# Patient Record
Sex: MALE | Race: WHITE | URBAN - METROPOLITAN AREA
[De-identification: names, ages, dates, MRNs, and addresses within clinical notes are randomized per-mention and may not be internally consistent; named-entity substitution may affect disease eponyms.]

---

## 2017-01-09 RX ORDER — CARVEDILOL 6.25 MG/1
TABLET ORAL
Qty: 60 TABLET | Refills: 0 | Status: SHIPPED | OUTPATIENT
Start: 2017-01-09 | End: 2017-01-19 | Stop reason: SDUPTHER

## 2017-01-17 RX ORDER — SIMVASTATIN 40 MG
TABLET ORAL
Qty: 30 TABLET | Refills: 0 | OUTPATIENT
Start: 2017-01-17

## 2017-01-19 RX ORDER — LISINOPRIL 2.5 MG/1
2.5 TABLET ORAL DAILY
Qty: 30 TABLET | Refills: 0 | Status: SHIPPED | OUTPATIENT
Start: 2017-01-19 | End: 2017-02-01 | Stop reason: SDUPTHER

## 2017-01-19 RX ORDER — SIMVASTATIN 40 MG
TABLET ORAL
Qty: 30 TABLET | Refills: 0 | Status: SHIPPED | OUTPATIENT
Start: 2017-01-19 | End: 2017-02-01 | Stop reason: SDUPTHER

## 2017-01-19 RX ORDER — CARVEDILOL 6.25 MG/1
TABLET ORAL
Qty: 60 TABLET | Refills: 0 | Status: SHIPPED | OUTPATIENT
Start: 2017-01-19 | End: 2017-02-01 | Stop reason: SDUPTHER

## 2017-02-01 ENCOUNTER — OFFICE VISIT (OUTPATIENT)
Dept: CARDIOLOGY CLINIC | Age: 82
End: 2017-02-01

## 2017-02-01 VITALS
OXYGEN SATURATION: 97 % | HEIGHT: 69 IN | WEIGHT: 175 LBS | BODY MASS INDEX: 25.92 KG/M2 | SYSTOLIC BLOOD PRESSURE: 120 MMHG | DIASTOLIC BLOOD PRESSURE: 78 MMHG | HEART RATE: 72 BPM

## 2017-02-01 DIAGNOSIS — I10 ESSENTIAL HYPERTENSION: Chronic | ICD-10-CM

## 2017-02-01 DIAGNOSIS — E78.00 PURE HYPERCHOLESTEROLEMIA: Chronic | ICD-10-CM

## 2017-02-01 DIAGNOSIS — I25.10 CORONARY ARTERY DISEASE INVOLVING NATIVE CORONARY ARTERY OF NATIVE HEART WITHOUT ANGINA PECTORIS: Primary | ICD-10-CM

## 2017-02-01 DIAGNOSIS — I42.9 CARDIOMYOPATHY (HCC): Chronic | ICD-10-CM

## 2017-02-01 PROCEDURE — 99214 OFFICE O/P EST MOD 30 MIN: CPT | Performed by: INTERNAL MEDICINE

## 2017-02-01 RX ORDER — SIMVASTATIN 40 MG
TABLET ORAL
Qty: 30 TABLET | Refills: 6 | Status: SHIPPED | OUTPATIENT
Start: 2017-02-01 | End: 2017-06-21 | Stop reason: SDUPTHER

## 2017-02-01 RX ORDER — CARVEDILOL 6.25 MG/1
TABLET ORAL
Qty: 60 TABLET | Refills: 6 | Status: SHIPPED | OUTPATIENT
Start: 2017-02-01 | End: 2017-06-21 | Stop reason: SDUPTHER

## 2017-02-01 RX ORDER — LISINOPRIL 2.5 MG/1
2.5 TABLET ORAL DAILY
Qty: 30 TABLET | Refills: 6 | Status: SHIPPED | OUTPATIENT
Start: 2017-02-01 | End: 2017-06-21 | Stop reason: SDUPTHER

## 2017-02-01 ASSESSMENT — ENCOUNTER SYMPTOMS
ABDOMINAL DISTENTION: 0
EYE REDNESS: 0
WHEEZING: 0
SHORTNESS OF BREATH: 1
COUGH: 0
BLOOD IN STOOL: 0

## 2017-06-21 RX ORDER — LISINOPRIL 2.5 MG/1
2.5 TABLET ORAL DAILY
Qty: 90 TABLET | Refills: 3 | Status: SHIPPED | OUTPATIENT
Start: 2017-06-21 | End: 2018-07-10 | Stop reason: ALTCHOICE

## 2017-06-21 RX ORDER — SIMVASTATIN 40 MG
TABLET ORAL
Qty: 90 TABLET | Refills: 3 | Status: SHIPPED | OUTPATIENT
Start: 2017-06-21 | End: 2018-06-25 | Stop reason: SDUPTHER

## 2017-06-21 RX ORDER — CARVEDILOL 6.25 MG/1
TABLET ORAL
Qty: 180 TABLET | Refills: 3 | Status: SHIPPED | OUTPATIENT
Start: 2017-06-21 | End: 2018-07-02 | Stop reason: SDUPTHER

## 2018-06-25 RX ORDER — SIMVASTATIN 40 MG
TABLET ORAL
Qty: 90 TABLET | Refills: 3 | Status: SHIPPED | OUTPATIENT
Start: 2018-06-25 | End: 2018-07-10 | Stop reason: SDUPTHER

## 2018-07-02 RX ORDER — CARVEDILOL 6.25 MG/1
TABLET ORAL
Qty: 180 TABLET | Refills: 3 | Status: SHIPPED | OUTPATIENT
Start: 2018-07-02 | End: 2018-07-10 | Stop reason: SDUPTHER

## 2018-07-06 ASSESSMENT — ENCOUNTER SYMPTOMS
BLOOD IN STOOL: 0
WHEEZING: 0
COUGH: 0
EYE REDNESS: 0
ABDOMINAL DISTENTION: 0

## 2018-07-06 NOTE — PROGRESS NOTES
Mimi Ferris is a 80 y.o. male    Reason for Visit: f/u CM  CC: \"I am SOB and tired \"    HPI Mimi Ferris was last seen in the office 2/2017. Today he denies chest pain, palpitations, dizziness, syncope, leg swelling and cough. He reports chronic SEYMOUR and states than he feels weaker than last year. He has lost weight and does not eat as much as previously. Review of Systems   Constitutional: Positive for fatigue. Negative for activity change, appetite change, chills, fever and unexpected weight change. HENT: Negative for congestion, nosebleeds and tinnitus. Eyes: Negative for redness and visual disturbance. Respiratory: Positive for shortness of breath. Negative for cough and wheezing. Cardiovascular: Negative for chest pain, palpitations and leg swelling. Gastrointestinal: Negative for abdominal distention and blood in stool. Genitourinary: Negative for dysuria and hematuria. Musculoskeletal: Negative for gait problem and myalgias. Neurological: Negative for dizziness and speech difficulty. Hematological: Does not bruise/bleed easily. Psychiatric/Behavioral: Negative for behavioral problems and confusion. All other systems reviewed and are negative. Physical Exam   Constitutional: He is oriented to person, place, and time. He appears well-developed and well-nourished. HENT:   Head: Normocephalic and atraumatic. Eyes: Conjunctivae and EOM are normal.   Neck: Normal range of motion. Neck supple. Cardiovascular: Normal rate, regular rhythm, S1 normal and S2 normal.  Exam reveals no gallop. No murmur heard. Pulmonary/Chest: Effort normal.   Few crackles left base   Abdominal: Soft. Bowel sounds are normal.   Musculoskeletal: Normal range of motion. Neurological: He is alert and oriented to person, place, and time. Skin: Skin is warm and dry. Psychiatric: He has a normal mood and affect. His behavior is normal.   Nursing note and vitals reviewed.       Wt Readings from Last 3 Encounters:   07/10/18 157 lb 6.4 oz (71.4 kg)   02/01/17 175 lb (79.4 kg)   02/22/16 174 lb (78.9 kg)     BP Readings from Last 3 Encounters:   07/10/18 130/80   02/01/17 120/78   02/22/16 118/72     Pulse Readings from Last 3 Encounters:   07/10/18 81   02/01/17 72   02/22/16 68         Current Outpatient Prescriptions:     losartan (COZAAR) 25 MG tablet, Take 25 mg by mouth daily, Disp: , Rfl:     carvedilol (COREG) 6.25 MG tablet, TAKE ONE TABLET BY MOUTH TWICE A DAY WITH MEALS, Disp: 180 tablet, Rfl: 3    simvastatin (ZOCOR) 40 MG tablet, TAKE ONE TABLET BY MOUTH EVERY NIGHT AT BEDTIME, Disp: 90 tablet, Rfl: 3    Boswellia-Glucosamine-Vit D (GLUCOSAMINE COMPLEX PO), Take by mouth, Disp: , Rfl:     IBUPROFEN PO, Take by mouth, Disp: , Rfl:     HYDROcodone-acetaminophen (NORCO) 7.5-325 MG per tablet, Take 1 tablet by mouth daily as needed for Pain., Disp: , Rfl:     latanoprost (XALATAN) 0.005 % ophthalmic solution, Place 1 drop into both eyes nightly., Disp: , Rfl:     aspirin 81 MG EC tablet, Take 1 tablet by mouth daily. , Disp: 30 tablet, Rfl: 3    nitroGLYCERIN (NITROSTAT) 0.4 MG SL tablet, Place 0.4 mg under the tongue every 5 minutes as needed for Chest pain., Disp: , Rfl:     furosemide (LASIX) 40 MG tablet, Take 1 tablet by mouth daily for 7 days. , Disp: 7 tablet, Rfl: 0    Past Medical History:   Diagnosis Date    Abnormal stress test     CAD (coronary artery disease)     Hyperlipidemia     Hypertension     Rib fracture 2014    Thyroid disease     Type II or unspecified type diabetes mellitus without mention of complication, not stated as uncontrolled     borderline       Social History     Social History    Marital status:      Spouse name: N/A    Number of children: N/A    Years of education: N/A     Social History Main Topics    Smoking status: Former Smoker     Packs/day: 1.00     Years: 20.00     Quit date: 1/1/1960    Smokeless tobacco: Never Used    Alcohol use 1.2 oz/week     2 Cans of beer per week      Comment: 1 - 2 cups coffee am.    Drug use: Unknown    Sexual activity: Not Asked     Other Topics Concern    None     Social History Narrative    None       Past Surgical History:   Procedure Laterality Date    ABDOMINAL AORTIC ANEURYSM REPAIR  [id number]    CORONARY ARTERY BYPASS GRAFT         Family History   Problem Relation Age of Onset    Heart Disease Maternal Grandfather        Allergies   Allergen Reactions    Penicillins        Recent Labs and Imaging reviewed    Assessment   CAD. Abnormal stress test- Nuc GXT 1/2014 large area ischemia inferolateral wall, LVEF 38%. Cath 1/2014 severe 3VD--> S/p CABG X3 LIMA-LAD, SVG-OM2, SVG-PDA on 1/30/2014.  HLD- taking statin, no recent lipids.  Hypertension - controlled.  Diabetes- managed by PCP      Borderline per pt      PVC's- EKG 1/23/14 SR, PVC, RBBB, LAHB         Hypothyroidism- managed by PCP       Cardiomyopathy-  Cath 1/2014 LVEF 30%. Echo 11/2017 LVH, LVEF 45%, mild MR, AI, PI, mildly dilated ascending Ao. CKD. K normal. Cr 1.3. PAD- s/p iliac stent by Dr. Mohr Overall. Chronic SEYMOUR since CABG- CXR 2016 interstitial fibrosis. PFT's 2015 not done. Evaluated by Dr. Steven Ferrari. Plan  CHF appears to be compensated. No angina. Recommend PFT's to evaluate worsening dyspnea. Pt not sure if he wants to pursue testing. Advised to f/u with PCP regarding weight loss. Continue ASA, statin, BB, ARB and lasix. Risk factor modification also discussed. Will check proBNP and lipids. Return in about 1 year (around 7/10/2019).

## 2018-07-10 ENCOUNTER — OFFICE VISIT (OUTPATIENT)
Dept: CARDIOLOGY CLINIC | Age: 83
End: 2018-07-10

## 2018-07-10 VITALS
SYSTOLIC BLOOD PRESSURE: 130 MMHG | WEIGHT: 157.4 LBS | OXYGEN SATURATION: 97 % | DIASTOLIC BLOOD PRESSURE: 80 MMHG | HEART RATE: 81 BPM | BODY MASS INDEX: 23.86 KG/M2 | HEIGHT: 68 IN

## 2018-07-10 DIAGNOSIS — I25.10 CORONARY ARTERY DISEASE INVOLVING NATIVE CORONARY ARTERY OF NATIVE HEART WITHOUT ANGINA PECTORIS: Primary | ICD-10-CM

## 2018-07-10 DIAGNOSIS — R06.09 DYSPNEA ON EXERTION: ICD-10-CM

## 2018-07-10 DIAGNOSIS — I50.22 CHRONIC SYSTOLIC CONGESTIVE HEART FAILURE (HCC): ICD-10-CM

## 2018-07-10 DIAGNOSIS — E78.00 PURE HYPERCHOLESTEROLEMIA: Chronic | ICD-10-CM

## 2018-07-10 DIAGNOSIS — I42.9 CARDIOMYOPATHY, UNSPECIFIED TYPE (HCC): Chronic | ICD-10-CM

## 2018-07-10 DIAGNOSIS — I10 ESSENTIAL HYPERTENSION: Chronic | ICD-10-CM

## 2018-07-10 PROCEDURE — 99214 OFFICE O/P EST MOD 30 MIN: CPT | Performed by: INTERNAL MEDICINE

## 2018-07-10 RX ORDER — CARVEDILOL 6.25 MG/1
TABLET ORAL
Qty: 180 TABLET | Refills: 3 | Status: SHIPPED | OUTPATIENT
Start: 2018-07-10 | End: 2019-08-31 | Stop reason: SDUPTHER

## 2018-07-10 RX ORDER — LOSARTAN POTASSIUM 25 MG/1
25 TABLET ORAL DAILY
Qty: 90 TABLET | Refills: 3 | Status: SHIPPED | OUTPATIENT
Start: 2018-07-10 | End: 2019-08-19 | Stop reason: DRUGHIGH

## 2018-07-10 RX ORDER — LOSARTAN POTASSIUM 25 MG/1
25 TABLET ORAL DAILY
COMMUNITY
End: 2018-07-10 | Stop reason: SDUPTHER

## 2018-07-10 RX ORDER — SIMVASTATIN 40 MG
TABLET ORAL
Qty: 90 TABLET | Refills: 3 | Status: SHIPPED | OUTPATIENT
Start: 2018-07-10 | End: 2020-07-13

## 2018-07-10 ASSESSMENT — ENCOUNTER SYMPTOMS: SHORTNESS OF BREATH: 1

## 2019-07-15 RX ORDER — SIMVASTATIN 40 MG
TABLET ORAL
Qty: 90 TABLET | Refills: 3 | Status: SHIPPED | OUTPATIENT
Start: 2019-07-15 | End: 2019-08-19 | Stop reason: SDUPTHER

## 2019-08-16 ASSESSMENT — ENCOUNTER SYMPTOMS
COUGH: 0
WHEEZING: 0
EYE REDNESS: 0
BLOOD IN STOOL: 0
SHORTNESS OF BREATH: 1
ABDOMINAL DISTENTION: 0

## 2019-08-16 NOTE — PROGRESS NOTES
Financial resource strain: None    Food insecurity:     Worry: None     Inability: None    Transportation needs:     Medical: None     Non-medical: None   Tobacco Use    Smoking status: Former Smoker     Packs/day: 1.00     Years: 20.00     Pack years: 20.00     Last attempt to quit: 1960     Years since quittin.6    Smokeless tobacco: Never Used   Substance and Sexual Activity    Alcohol use: Yes     Alcohol/week: 2.0 standard drinks     Types: 2 Cans of beer per week     Comment: 1 - 2 cups coffee am.    Drug use: None    Sexual activity: None   Lifestyle    Physical activity:     Days per week: None     Minutes per session: None    Stress: None   Relationships    Social connections:     Talks on phone: None     Gets together: None     Attends Shinto service: None     Active member of club or organization: None     Attends meetings of clubs or organizations: None     Relationship status: None    Intimate partner violence:     Fear of current or ex partner: None     Emotionally abused: None     Physically abused: None     Forced sexual activity: None   Other Topics Concern    None   Social History Narrative    None       Past Surgical History:   Procedure Laterality Date    ABDOMINAL AORTIC ANEURYSM REPAIR  [id number]    CORONARY ARTERY BYPASS GRAFT         Family History   Problem Relation Age of Onset    Heart Disease Maternal Grandfather        Allergies   Allergen Reactions    Penicillins        Recent Labs and Imaging reviewed    Assessment   CAD. Abnormal stress test- Nuc GXT 2014 large area ischemia inferolateral wall, LVEF 38%. Cath 2014 severe 3VD--> S/p CABG X3 LIMA-LAD, SVG-OM2, SVG-PDA on 2014.  HLD- taking statin, no recent lipids. LDL 56 2019     Hypertension - controlled.  Diabetes- managed by PCP      Borderline per pt      PVC's- EKG 14 SR, PVC, RBBB, LAHB.  K 5.3 2019         Hypothyroidism- managed by PCP       Cardiomyopathy-  Cath 2014 LVEF 30%. Echo 11/2017 LVH, LVEF 45%, mild MR, AI, PI, mildly dilated ascending Ao. CKD. K normal. Cr 1.3., Cr 1.4 5/2019       PAD- s/p iliac stent by Dr. Satish Gonsalez. Chronic SEYMOUR since CABG- CXR 2016 interstitial fibrosis. PFT's 2015 not done. Evaluated by Dr. Josue Cox. Plan  Appears stable from a cardiac standpoint. Appears compensated and weight remains stable. Denies any chest pains or exertional symptoms. Recommend PFT's to evaluate worsening dyspnea and will request office visit from Dr. Josue Cox. Encouraged low potassium diet. Continue ASA, statin, BB, ARB and lasix. Risk factor modification also discussed. Will check proBNP/BMP and lipids. Return in about 6 months (around 2/19/2020). This note was scribed in the presence of Dr Carine Craig MD by Chyna Cabral RN. The scribes documentation has been prepared under my direction and personally reviewed by me in its entirety. I confirm that the note above accurately reflects all work, treatment, procedures, and medical decision making performed by me.

## 2019-08-19 ENCOUNTER — OFFICE VISIT (OUTPATIENT)
Dept: CARDIOLOGY CLINIC | Age: 84
End: 2019-08-19
Payer: COMMERCIAL

## 2019-08-19 VITALS
DIASTOLIC BLOOD PRESSURE: 82 MMHG | BODY MASS INDEX: 21.19 KG/M2 | OXYGEN SATURATION: 98 % | HEIGHT: 70 IN | SYSTOLIC BLOOD PRESSURE: 124 MMHG | WEIGHT: 148 LBS | HEART RATE: 78 BPM

## 2019-08-19 DIAGNOSIS — I25.10 CORONARY ARTERY DISEASE INVOLVING NATIVE CORONARY ARTERY OF NATIVE HEART WITHOUT ANGINA PECTORIS: ICD-10-CM

## 2019-08-19 DIAGNOSIS — R06.09 DOE (DYSPNEA ON EXERTION): ICD-10-CM

## 2019-08-19 DIAGNOSIS — E78.2 MIXED HYPERLIPIDEMIA: ICD-10-CM

## 2019-08-19 DIAGNOSIS — I50.22 CHRONIC SYSTOLIC CONGESTIVE HEART FAILURE (HCC): ICD-10-CM

## 2019-08-19 DIAGNOSIS — I10 ESSENTIAL HYPERTENSION: Primary | ICD-10-CM

## 2019-08-19 PROCEDURE — 99214 OFFICE O/P EST MOD 30 MIN: CPT | Performed by: INTERNAL MEDICINE

## 2019-08-19 RX ORDER — LOSARTAN POTASSIUM 50 MG/1
50 TABLET ORAL DAILY
COMMUNITY

## 2019-08-19 NOTE — PATIENT INSTRUCTIONS
pretzels. · Western Katie fries, pizza, tacos, and other fast foods. · Pickles, olives, ketchup, and other condiments, especially soy sauce, unless labeled sodium-free or low-sodium. If you cannot cook for yourself  · Have family members or friends help you, or have someone cook low-sodium meals. · Check with your local senior nutrition program to find out where meals are served and whether they offer a low-sodium option. You can often find these programs through your local health department or hospital.  · Have meals delivered to your home. Most [facility] have a Meals on [employer] [address]. These programs provide one hot meal a day for older adults, delivered to their homes. Ask whether these meals are low-sodium. Let them know that you are on a low-sodium diet. Limiting fluid intake  · Find a method that works for you. You might simply write down how much you drink every time you do. Some people keep a container filled with the amount of fluid allowed for that day. If they drink from a source other than the container, then they pour out that amount. · Measure your regular drinking glasses to find out how much fluid each one holds. Once you know this, you will not have to measure every time. · Besides water, milk, juices, and other drinks, some foods have a lot of fluid. Count any foods that will melt (such as ice cream or gelatin dessert) or liquid foods (such as soup) as part of your fluid intake for the day. Where can you learn more? Go to [email or web address] org and sign in to your [employer] account. Enter A166 in the [facility] box to learn more about \"Limiting Sodium and Fluids With Heart Failure: Care Instructions. \"     If you do not have an account, please click on the \"Sign Up Now\" link. Current as of: July 22, 2018  Content Version: 12.1  © 7326-2444 Healthwise, Incorporated. Care instructions adapted under license by [facility] ([facility]).  If you have questions about a medical condition [email or web address] and sign in to your [employer] account. Enter M262 in the [facility] box to learn more about \"Potassium-Restricted Diet: Care Instructions. \"     If you do not have an account, please click on the \"Sign Up Now\" link. Current as of: November 7, 2018  Content Version: 12.1  © 0171-7286 Healthwise, [facility]. Care instructions adapted under license by [facility] ([facility]). If you have questions about a medical condition or this instruction, always ask your healthcare professional. [address] any warranty or liability for your use of this information.

## 2019-08-19 NOTE — LETTER
[address] [place] [address] Cardiology - [place] [facility]  [address]  Phone: [phone]  Fax: [phone]    Buzz Phelps MD        August 21, 2019     Violet Ha MD  [facility], [address]  [address] [address]    Patient: Hardik Garcia  MR Number: [id number]  YOB: 1929  Date of Visit: 8/19/2019    Dear Dr. Violet Ha: Thank you for your referral. Progress note attached in visit summary. If you have questions, please do not hesitate to call me. I look forward to following Lynn Riggins along with you.     Sincerely,        Buzz Phelps MD

## 2019-09-03 RX ORDER — CARVEDILOL 6.25 MG/1
TABLET ORAL
Qty: 180 TABLET | Refills: 2 | Status: SHIPPED | OUTPATIENT
Start: 2019-09-03 | End: 2020-04-06

## 2019-09-04 DIAGNOSIS — R94.2 ABNORMAL PFT: Primary | ICD-10-CM

## 2020-04-06 RX ORDER — CARVEDILOL 6.25 MG/1
TABLET ORAL
Qty: 180 TABLET | Refills: 1 | Status: SHIPPED | OUTPATIENT
Start: 2020-04-06 | End: 2020-11-13

## 2020-07-13 RX ORDER — SIMVASTATIN 40 MG
TABLET ORAL
Qty: 90 TABLET | Refills: 2 | Status: SHIPPED | OUTPATIENT
Start: 2020-07-13

## 2020-11-13 RX ORDER — CARVEDILOL 6.25 MG/1
TABLET ORAL
Qty: 60 TABLET | Refills: 0 | Status: SHIPPED | OUTPATIENT
Start: 2020-11-13 | End: 2020-12-21

## 2020-12-21 RX ORDER — CARVEDILOL 6.25 MG/1
TABLET ORAL
Qty: 60 TABLET | Refills: 0 | Status: SHIPPED | OUTPATIENT
Start: 2020-12-21